# Patient Record
(demographics unavailable — no encounter records)

---

## 2024-12-13 NOTE — HISTORY OF PRESENT ILLNESS
[FreeTextEntry1] : CC: 12 y 9 mo old right handed boy with febrile seizure, speech delay, late walking, sleep difficulties, and generalized epilepsy. Here for a follow up visit.  Interval course: Since last seen, Magan has had several breakthrough seizures, despite gradual titration of the anticonvulsant. Most recent seizures occurred in 2024, 2024, 2024, 2024. All seizures occurred around 7-9 AM and 7-9 PM. He has not required rescue medication. Today's routine EEG ([facility]) revealed mild generalized background slowing and rare generalized epileptiform discharges. A brain MRI (2024) was unrevealing. For seizure control, he is on monotherapy with brand extended release Keppra, without side effects.  General health is good. He sustained head trauma when he had the 2024 seizure but did not have loss of consciousness. No medication allergies. No surgeries. Up to date with immunizations. He has a history of delayed speech and late walking. Parents refer that he never received therapies. Sleep is fairly good. Shares bedroom with 2 siblings. Usually sleeps from around 9:30 PM to around 6:30 AM. He sometimes wakes up during the night, to use the bathroom. Academically, he is reportedly doing well. Currently in 8th grade. 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 Classroom has 19 students.  Current CNS medications: Brand extended release Keppra 1500 mg BID. Most recent trough level 29.6 PRN intranasal Valtoco 10 mg as rescue for breakthrough seizures. Never yet needed.  HPI: I first met Magan in 2024, when his parents sought a second opinion. He had a remote history of febrile seizure at the age of 3 years. In 3/2024, Magan had a generalized convulsion with a duration of 1 minute. He was sleep deprived the prior night (family celebration). The seizure onset occurred out of the awake state and was unwitnessed (mom heard a loud noise and walked into him having the convulsion). He was taken to [facility] where video EEG (3/2024) revealed generalized epileptiform discharges. He was started on generic Levetiracetam (250 mg BID dosing). A second seizure (also out of the awake state, after sleep deprivation) occurred in 2024. The generic Levetiracetam's dose was then titrated (500 mg BID). A third seizure occurred in 2024. Dose continued to be titrated and he was switched to brand formulation, without improvements noted.  Good general health. He sustained head trauma when he had the 2024 seizure but did not have loss of consciousness. No medication allergies. No surgeries. Up to date with immunizations. History of delayed speech and late walking. Parents referred that he never received therapies.  Prior CNS medications: Generic Levetiracetam. Ineffective   history: Magan was born at  via VD BW was 7 p 12 oz No  complications. No NICU stay  Developmental history: First steps 18-19 mo First words 18-19 mo Toilet trained "on time"  Family history: Younger sister with developmental delays  Social history: Lives with parents Goes to school  Past medical history: Late walking Febrile seizure Generalized epilepsy Sleep difficulties  Review of systems: General: No weight loss, weakness or recent fevers. Skin: No rashes, lumps, itching, color change, changes in hair/nails Head: No headaches, no head injury Eyes: No corrective eyeglasses. No discharge Ears: No changes in hearing, tinnitus, discharge Nose/Sinuses: No congestion, discharge, itching, epistaxis Mouth/Throat: Normal teeth and gums, no sore throat, hoarseness Neck: No lumps, pain, stiffness Respiratory: No cough, SOB, hemoptysis Cardiac: No edema, chest pain, dyspnea or orthopnea GI: No constipation, bloating or diarrhea : No hematuria, dysuria, urgency or enuresis Musculoskeletal: No joint inflammation or arthralgia Neuro: Seizures. Mild sleep difficulties. Psych: No mood, personality or behavioral concerns.  Physical Exam: HC 53 cm Well nourished, non dysmorphic child, in no distress Face is symmetric Neck is supple, no enlarged lymph nodes. Full range of motion. No meningism. No torticollis or webbing Hair has normal consistency, appearance, distribution Chest is symmetric Good air entry bilaterally. S1 S2 present, no murmur Abdomen soft, non tender, non distended Back has no deformities, no scoliosis, kyphosis or lordosis Awake, alert, great eye contact Very talkative and pleasant Intact speech Follows commands well Intact extraocular movements Pupils equal and reactive to light No nystagmus Unable to assess fundi Normal [place] Normal muscle tone and bulk Muscle strength 5/5 in 4 limbs distally and proximally: walks, jumps, climbs, hops Romberg negative No dysmetria No ataxia No abnormal movements Gait is normal in stride, patrick, and stance. Tip-toe, heel and tandem walk intact Finger to nose intact DTR 2+ in 4 limbs  Assessment: 12 y 9 mo old right handed boy with febrile seizure, speech delay, late walking, sleep difficulties, and generalized epilepsy. Suboptimal seizure control while on brand extended release Keppra.  Plan: Magan's visit today had a duration of 40 minutes (>50% of which was spent in direct counseling and coordination of his care). I personally reviewed Magan's medical history, medical records, tests results, recent developments, and then delineated next steps for his neurological care. Magan's parents and I reviewed juvenile onset generalized epilepsies in general, different treatment modalities, co morbidities and overall prognosis. Epilepsy is a chronic illness with potential for injury that poses a threat to life or bodily function. Magan is failing escalating doses of brand extended release Keppra. We discussed a transition from this medication to extended release Valproic acid. We reviewed these CNS medications' side effects profile, seizure action plan, acute management of breakthrough seizures with rescue medications, seizure precautions, medication adherence, common seizure triggers, and the rationale behind monitoring trough levels.  1)	Start generic extended release Valproic acid 250 mg QPM x 5 days, then go to 500 mg QPM x 5 days, then go to 750 mg QPM. 2)	Continue brand extended release Keppra at 1500 mg BID for now. Once at therapeutic level of the Valproic acid, we will start a slow tapering of the XR Keppra. 3)	PRN intranasal Valtoco 10 mg as rescue for seizures over 3 minutes 4)	CMP, CBC, anticonvulsants' trough levels in 3 weeks 5)	Inpatient video EEG for safe tapering of brand Keppra 6)	Wearable seizure detection device 7)	Follow up 8-10 weeks 8)	EMLA cream for blood draw.  Magan's parents understand plan, agree and want to move forward. All of their questions were answered. Magan's controlled substance history was obtained from the [place] state prescription monitoring program registry.   Jessica Pryor MD Pediatric Neurologist and Clinical Neurophysiologist Director Pediatric Epilepsy [facility] Clinical Professor of Neurology and Pediatrics, [facility] at [facility]

## 2024-12-13 NOTE — HISTORY OF PRESENT ILLNESS
[FreeTextEntry1] : CC: 12 y 9 mo old right handed boy with febrile seizure, speech delay, late walking, sleep difficulties, and generalized epilepsy. Here for a follow up visit.  Interval course: Since last seen, Magan has had several breakthrough seizures, despite gradual titration of the anticonvulsant. Most recent seizures occurred in 2024, 2024, 2024, 2024. All seizures occurred around 7-9 AM and 7-9 PM. He has not required rescue medication. Today's routine EEG ([facility]) revealed mild generalized background slowing and rare generalized epileptiform discharges. A brain MRI (2024) was unrevealing. For seizure control, he is on monotherapy with brand extended release Keppra, without side effects.  General health is good. He sustained head trauma when he had the 2024 seizure but did not have loss of consciousness. No medication allergies. No surgeries. Up to date with immunizations. He has a history of delayed speech and late walking. Parents refer that he never received therapies. Sleep is fairly good. Shares bedroom with 2 siblings. Usually sleeps from around 9:30 PM to around 6:30 AM. He sometimes wakes up during the night, to use the bathroom. Academically, he is reportedly doing well. Currently in 8th grade. 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 Classroom has 19 students.  Current CNS medications: Brand extended release Keppra 1500 mg BID. Most recent trough level 29.6 PRN intranasal Valtoco 10 mg as rescue for breakthrough seizures. Never yet needed.  HPI: I first met Magan in 2024, when his parents sought a second opinion. He had a remote history of febrile seizure at the age of 3 years. In 3/2024, Magan had a generalized convulsion with a duration of 1 minute. He was sleep deprived the prior night (family celebration). The seizure onset occurred out of the awake state and was unwitnessed (mom heard a loud noise and walked into him having the convulsion). He was taken to [facility] where video EEG (3/2024) revealed generalized epileptiform discharges. He was started on generic Levetiracetam (250 mg BID dosing). A second seizure (also out of the awake state, after sleep deprivation) occurred in 2024. The generic Levetiracetam's dose was then titrated (500 mg BID). A third seizure occurred in 2024. Dose continued to be titrated and he was switched to brand formulation, without improvements noted.  Good general health. He sustained head trauma when he had the 2024 seizure but did not have loss of consciousness. No medication allergies. No surgeries. Up to date with immunizations. History of delayed speech and late walking. Parents referred that he never received therapies.  Prior CNS medications: Generic Levetiracetam. Ineffective   history: Magan was born at  via VD BW was 7 p 12 oz No  complications. No NICU stay  Developmental history: First steps 18-19 mo First words 18-19 mo Toilet trained "on time"  Family history: Younger sister with developmental delays  Social history: Lives with parents Goes to school  Past medical history: Late walking Febrile seizure Generalized epilepsy Sleep difficulties  Review of systems: General: No weight loss, weakness or recent fevers. Skin: No rashes, lumps, itching, color change, changes in hair/nails Head: No headaches, no head injury Eyes: No corrective eyeglasses. No discharge Ears: No changes in hearing, tinnitus, discharge Nose/Sinuses: No congestion, discharge, itching, epistaxis Mouth/Throat: Normal teeth and gums, no sore throat, hoarseness Neck: No lumps, pain, stiffness Respiratory: No cough, SOB, hemoptysis Cardiac: No edema, chest pain, dyspnea or orthopnea GI: No constipation, bloating or diarrhea : No hematuria, dysuria, urgency or enuresis Musculoskeletal: No joint inflammation or arthralgia Neuro: Seizures. Mild sleep difficulties. Psych: No mood, personality or behavioral concerns.  Physical Exam: HC 53 cm Well nourished, non dysmorphic child, in no distress Face is symmetric Neck is supple, no enlarged lymph nodes. Full range of motion. No meningism. No torticollis or webbing Hair has normal consistency, appearance, distribution Chest is symmetric Good air entry bilaterally. S1 S2 present, no murmur Abdomen soft, non tender, non distended Back has no deformities, no scoliosis, kyphosis or lordosis Awake, alert, great eye contact Very talkative and pleasant Intact speech Follows commands well Intact extraocular movements Pupils equal and reactive to light No nystagmus Unable to assess fundi Normal [place] Normal muscle tone and bulk Muscle strength 5/5 in 4 limbs distally and proximally: walks, jumps, climbs, hops Romberg negative No dysmetria No ataxia No abnormal movements Gait is normal in stride, patrick, and stance. Tip-toe, heel and tandem walk intact Finger to nose intact DTR 2+ in 4 limbs  Assessment: 12 y 9 mo old right handed boy with febrile seizure, speech delay, late walking, sleep difficulties, and generalized epilepsy. Suboptimal seizure control while on brand extended release Keppra.  Plan: Magan's visit today had a duration of 40 minutes (>50% of which was spent in direct counseling and coordination of his care). I personally reviewed Magan's medical history, medical records, tests results, recent developments, and then delineated next steps for his neurological care. Magan's parents and I reviewed juvenile onset generalized epilepsies in general, different treatment modalities, co morbidities and overall prognosis. Epilepsy is a chronic illness with potential for injury that poses a threat to life or bodily function. Magan is failing escalating doses of brand extended release Keppra. We discussed a transition from this medication to extended release Valproic acid. We reviewed these CNS medications' side effects profile, seizure action plan, acute management of breakthrough seizures with rescue medications, seizure precautions, medication adherence, common seizure triggers, and the rationale behind monitoring trough levels.  1)	Start generic extended release Valproic acid 250 mg QPM x 5 days, then go to 500 mg QPM x 5 days, then go to 750 mg QPM. 2)	Continue brand extended release Keppra at 1500 mg BID for now. Once at therapeutic level of the Valproic acid, we will start a slow tapering of the XR Keppra. 3)	PRN intranasal Valtoco 10 mg as rescue for seizures over 3 minutes 4)	CMP, CBC, anticonvulsants' trough levels in 3 weeks 5)	Inpatient video EEG for safe tapering of brand Keppra 6)	Wearable seizure detection device 7)	Follow up 8-10 weeks 8)	EMLA cream for blood draw.  Magan's parents understand plan, agree and want to move forward. All of their questions were answered. Magan's controlled substance history was obtained from the [place] state prescription monitoring program registry.   Jessica Pryor MD Pediatric Neurologist and Clinical Neurophysiologist Director Pediatric Epilepsy [facility] Clinical Professor of Neurology and Pediatrics, [facility] Medicine at [facility]

## 2025-02-14 NOTE — HISTORY OF PRESENT ILLNESS
[FreeTextEntry1] : Telemedicine visit: Patient Location at time of Video Visit: Magan's parents were home during the visit. Physician Location at time of Visit: [address] [place] [address] [address] [place] [id number] Other Participants Present: None  I obtained parent consent and agreement for this video encounter. Additionally, I reviewed with the parent and addressed all questions regarding the disposition plan and follow-up instructions including any pertinent findings. The parent has acknowledged understanding of this information. I informed the parent that a copy of their after-visit summary for this visit is available for her to refer to within the secure patient portal.  CC: 12 y 11 mo old right handed boy with febrile seizure, speech delay, late walking, sleep difficulties, and generalized epilepsy. Telemedicine video follow up visit.   Interval course: Since last seen, Magan's anticonvulsant regimen has been modified. Due to ineffectiveness to brand extended release Keppra, delayed release Depakote was added (he had side effects to the generic delayed release Valproic acid). Most recent seizures occurred in 2024, 2024, 2024, 2024. All seizures occurred around 7-9 AM and 7-9 PM. He has not required rescue medication. A routine EEG ([facility] 2024) revealed mild generalized background slowing and rare generalized epileptiform discharges. A brain MRI (2024) was unrevealing.  General health is good. He sustained head trauma when he had the 2024 seizure but did not have loss of consciousness. No medication allergies. No surgeries. Up to date with immunizations. He has a history of delayed speech and late walking. Parents refer that he never received therapies. Sleep is fairly good. Shares bedroom with 2 siblings. Usually sleeps from around 9:30 PM to around 6:30 AM. He sometimes wakes up during the night, to use the bathroom. Academically, he is reportedly doing well. Currently in 8th grade. 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 Classroom has 19 students.  Current CNS medications: Brand extended release Keppra 1500 mg BID. Most recent trough level 29.6 Brand delayed release Depakote 1000 mg QPM. Most recent trough level 35 PRN intranasal Valtoco 10 mg as rescue for breakthrough seizures. Never yet needed.  HPI: I first met Magan in 2024, when his parents sought a second opinion. He had a remote history of febrile seizure at the age of 3 years. In 3/2024, Magan had a generalized convulsion with a duration of 1 minute. He was sleep deprived the prior night (family celebration). The seizure onset occurred out of the awake state and was unwitnessed (mom heard a loud noise and walked into him having the convulsion). He was taken to [facility] where video EEG (3/2024) revealed generalized epileptiform discharges. He was started on generic Levetiracetam (250 mg BID dosing). A second seizure (also out of the awake state, after sleep deprivation) occurred in 2024. The generic Levetiracetam's dose was then titrated (500 mg BID). A third seizure occurred in 2024. Dose continued to be titrated and he was switched to brand formulation, without improvements noted.  Good general health. He sustained head trauma when he had the 2024 seizure but did not have loss of consciousness. No medication allergies. No surgeries. Up to date with immunizations. History of delayed speech and late walking. Parents referred that he never received therapies.  Prior CNS medications: Generic Levetiracetam. Ineffective Generic Valproic acid. Side effects   history: Magan was born at [facility] via [facility] BW was 7 p 12 oz No  complications. No NICU stay  Developmental history: First steps 18-19 mo First words 18-19 mo Toilet trained "on time"  Family history: Younger sister with developmental delays  Social history: Lives with parents Goes to school  Past medical history: Late walking Febrile seizure Generalized epilepsy Sleep difficulties  Review of systems: General: No weight loss, weakness or recent fevers. Skin: No rashes, lumps, itching, color change, changes in hair/nails Head: No headaches, no head injury Eyes: No corrective eyeglasses. No discharge Ears: No changes in hearing, tinnitus, discharge Nose/Sinuses: No congestion, discharge, itching, epistaxis Mouth/Throat: Normal teeth and gums, no sore throat, hoarseness Neck: No lumps, pain, stiffness Respiratory: No cough, SOB, hemoptysis Cardiac: No edema, chest pain, dyspnea or orthopnea GI: No constipation, bloating or diarrhea : No hematuria, dysuria, urgency or enuresis Musculoskeletal: No joint inflammation or arthralgia Neuro: Seizures. Mild sleep difficulties. Psych: No mood, personality or behavioral concerns.  Physical Exam: Deferred  Assessment: 12 y 11 mo old right handed boy with febrile seizure, speech delay, late walking, sleep difficulties, and generalized epilepsy. Tolerating medication changes.  Plan: This televisit today had a duration of 40 minutes (>50% of which was spent in direct counseling and coordination of his care). I personally reviewed Magan's medical history, medical records, tests results, recent developments, and then delineated next steps for his neurological care. Magan's parents and I reviewed juvenile onset generalized epilepsies in general, different treatment modalities, co morbidities and overall prognosis. Epilepsy is a chronic illness with potential for injury that poses a threat to life or bodily function. Magan was failing escalating doses of brand extended release Keppra, so generic delayed release Valproic acid was added. We reviewed these CNS medications' side effects profile, seizure action plan, acute management of breakthrough seizures with rescue medications, seizure precautions, medication adherence, common seizure triggers, and the rationale behind monitoring trough levels.  1)	Continue brand Depakote delayed release at 1000 mg QPM (he is still on generic, having side effect, awaiting for pharmacy to fill the brand formulation) 2)	Continue night dose of brand extended release Keppra at 1500 mg QPM 3)	Slow taper of morning dose of Keppra (using regular release tablets) from 1500 mg QAM, by lowering 500 mg every 5 days 4)	PRN intranasal Valtoco 10 mg as rescue for seizures over 3 minutes 5)	Inpatient video EEG for safety during rapid tapering of the night dose of brand Keppra XR 6)	CBC, CMP, TFTs, Depakote trough level (needs EMLA) 7)	Continue sharing bedroom with sibling, for safety 8)	Monitor weight/appetite 9)	Parents trying to get the wearable seizure detection device 10)	 Follow up after video EEG  Magan's parents understand plan, agree and want to move forward. All of their questions were answered. Magan's controlled substance history was obtained from the [place] state prescription monitoring program registry.   Jessica Pryor MD Pediatric Neurologist and Clinical Neurophysiologist Director Pediatric Epilepsy [facility] at [facility] Clinical Professor of Neurology and Pediatrics, [facility] at [facility]

## 2025-05-19 NOTE — HISTORY OF PRESENT ILLNESS
[FreeTextEntry1] : CC: 13 y 2 mo old right handed teen with febrile seizure, speech delay, late walking, sleep difficulties, and generalized epilepsy. Here for a follow up visit.  Interval course: Since last seen, Magan's anticonvulsant regimen has been modified. Due to ineffectiveness to brand extended release Keppra this medication was gradually replaced by brand delayed release Depakote. Most recent seizures occurred in 2025, 2024, 2024, 2024, 2024. All seizures occurred around 7-9 AM and 7-9 PM. He has not required rescue medication. No seizures since on current doses of the anticonvulsant. Today's routine EEG ([facility]) was normal. A video EEG ([facility] 3/2025) revealed very rare spike and polyspike and wave complexes.  A routine EEG ([facility] 2024) revealed mild generalized background slowing and rare generalized epileptiform discharges. A brain MRI (2024) was unrevealing.  General health is good. No medication allergies. No surgeries. Up to date with immunizations. Sleep is fairly good. Shares bedroom with 2 siblings. Usually sleeps from around 9:30 PM to around 6:30 AM. He sometimes wakes up during the night, to use the bathroom. Academically, he is struggling. Currently in 8th grade. 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 Classroom has 19 students. Has tutoring sessions 3 times a week.  Current CNS medications: Brand delayed release Depakote 1250 mg QPM.  PRN intranasal Valtoco 10 mg as rescue for breakthrough seizures. Never yet needed. Vitron C 1 cap a day  HPI: I first met Magan in 2024, when his parents sought a second opinion. He had a remote history of febrile seizure at the age of 3 years. In 3/2024, Magan had a generalized convulsion with a duration of 1 minute. He was sleep deprived the prior night (family celebration). The seizure onset occurred out of the awake state and was unwitnessed (mom heard a loud noise and walked into him having the convulsion). [facility] was taken to [facility] where video EEG (3/2024) revealed generalized epileptiform discharges. He was started on generic Levetiracetam (250 mg BID dosing). A second seizure (also out of the awake state, after sleep deprivation) occurred in 2024. The generic Levetiracetam's dose was then titrated (500 mg BID). A third seizure occurred in 2024. Dose continued to be titrated and he was switched to brand formulation, without improvements noted.  Good general health. He sustained head trauma when he had the 2024 seizure but did not have loss of consciousness. No medication allergies. No surgeries. Up to date with immunizations. History of delayed speech and late walking. Parents referred that he never received therapies.  Prior CNS medications: Generic Levetiracetam. Ineffective Generic Valproic acid. Side effects Brand extended release Keppra   history: Magan was born at [facility] via [facility] BW was 7 p 12 oz No  complications. No NICU stay  Developmental history: First steps 18-19 mo First words 18-19 mo Toilet trained "on time"  Family history: Younger sister with developmental delays  Social history: Lives with parents Goes to school  Past medical history: Late walking Febrile seizure Generalized epilepsy Sleep difficulties  Review of systems: General: No weight loss, weakness or recent fevers. Skin: No rashes, lumps, itching, color change, changes in hair/nails Head: No headaches, no head injury Eyes: No corrective eyeglasses. No discharge Ears: No changes in hearing, tinnitus, discharge Nose/Sinuses: No congestion, discharge, itching, epistaxis Mouth/Throat: Normal teeth and gums, no sore throat, hoarseness Neck: No lumps, pain, stiffness Respiratory: No cough, SOB, hemoptysis Cardiac: No edema, chest pain, dyspnea or orthopnea GI: No constipation, bloating or diarrhea : No hematuria, dysuria, urgency or enuresis Musculoskeletal: No joint inflammation or arthralgia Neuro: Seizures. Mild sleep difficulties. Psych: No mood, personality or behavioral concerns.  Physical Exam: HC 53 cm Non dysmorphic teen in no distress Face is symmetric Neck is supple, no enlarged lymph nodes. Full range of motion. No torticollis or webbing Hair has normal consistency, appearance, distribution Awake, alert, great eye contact Very talkative and pleasant Intact speech Follows commands well Intact extraocular movements No nystagmus Normal muscle tone and bulk Muscle strength 5/5 in 4 limbs distally and proximally: walks, jumps, climbs, hops Romberg negative No dysmetria No ataxia No abnormal movements Gait is normal in stride, patrick, and stance. Tip-toe, heel and tandem walk intact Finger to nose intact DTR deferred  Assessment: 13 y 2 mo old right handed teen with febrile seizure, speech delay, late walking, sleep difficulties, and generalized epilepsy. Exhibiting good seizure control while on monotherapy with brand Depakote.  Plan: Magan's visit today had a duration of 40 minutes (>50% of which was spent in direct counseling and coordination of his care). I personally reviewed Magan's medical history, medical records, tests results, recent developments, and then delineated next steps for his neurological care. Magan's dad and I reviewed juvenile onset generalized epilepsies in general, different treatment modalities, co morbidities and overall prognosis. Epilepsy is a chronic illness with potential for injury that poses a threat to life or bodily function. Magan's seizure control has improved since gradual switch from brand extended release Keppra to brand extended release Depakote. We reviewed this medication's side effects profile, seizure action plan, acute management of breakthrough seizures with rescue medications, seizure precautions, medication adherence, common seizure triggers, and the rationale behind monitoring trough levels.  1) Continue brand Depakote delayed release at 1250 mg QPM  2) PRN intranasal Valtoco 10 mg as rescue for seizures over 3 minutes 3) Continue Vitron C 1 cap a day 4) CMP, CBC, TFTs, Depakote trough level 5) Continue sharing bedroom with sibling, for safety 6) Monitor weight/appetite 7) Same day routine EEG and office visit in 4-5 mo  Magan's dad understands plan, agrees and wants to move forward. All of his questions were answered. Magan's controlled substance history was obtained from the [place] state prescription monitoring program registry.   Jessica Pryor MD Pediatric Neurologist and Clinical Neurophysiologist Director Pediatric Epilepsy [facility] Clinical Professor of Neurology and Pediatrics, [employer] at [facility]